# Patient Record
Sex: FEMALE | Race: OTHER | NOT HISPANIC OR LATINO
[De-identification: names, ages, dates, MRNs, and addresses within clinical notes are randomized per-mention and may not be internally consistent; named-entity substitution may affect disease eponyms.]

---

## 2019-06-06 PROBLEM — Z00.00 ENCOUNTER FOR PREVENTIVE HEALTH EXAMINATION: Status: ACTIVE | Noted: 2019-06-06

## 2019-06-10 ENCOUNTER — APPOINTMENT (OUTPATIENT)
Dept: SURGICAL ONCOLOGY | Facility: CLINIC | Age: 49
End: 2019-06-10
Payer: COMMERCIAL

## 2019-06-10 VITALS
SYSTOLIC BLOOD PRESSURE: 150 MMHG | HEART RATE: 96 BPM | OXYGEN SATURATION: 100 % | DIASTOLIC BLOOD PRESSURE: 89 MMHG | TEMPERATURE: 98 F

## 2019-06-10 VITALS — BODY MASS INDEX: 29.03 KG/M2 | HEIGHT: 67 IN | WEIGHT: 185 LBS

## 2019-06-10 DIAGNOSIS — Z78.9 OTHER SPECIFIED HEALTH STATUS: ICD-10-CM

## 2019-06-10 DIAGNOSIS — Z80.0 FAMILY HISTORY OF MALIGNANT NEOPLASM OF DIGESTIVE ORGANS: ICD-10-CM

## 2019-06-10 DIAGNOSIS — D05.11 INTRADUCTAL CARCINOMA IN SITU OF RIGHT BREAST: ICD-10-CM

## 2019-06-10 PROCEDURE — 99205 OFFICE O/P NEW HI 60 MIN: CPT

## 2019-06-10 NOTE — PHYSICAL EXAM
[Normal] : supple, no neck mass and thyroid not enlarged [Normal Axillary Lymph Nodes] : normal axillary lymph nodes [Normal Supraclavicular Lymph Nodes] : normal supraclavicular lymph nodes [Normal] : oriented to person, place and time, with appropriate affect [FreeTextEntry1] : Patient was interviewed and examined with chaperone present. \par  [de-identified] : Normal S1, S2. Regular Rate and rhythm [de-identified] : Complete bilateral breast examination performed supine and upright revealed no palpable masses, tenderness ,nipple discharge, inversion ,deviation or enlarged axillary or supraclavicular lymph node. [de-identified] : Clear breath sounds bilaterally, normal respiratory effort

## 2019-06-10 NOTE — ASSESSMENT
[FreeTextEntry1] : impression:\par Right breast Ca\par 3 sites : 2 DCIS ,1 IDC\par \par \par The patient and I discussed the surgical management of breast cancer. I explained that breast cancer can be treated with 2 main surgical approaches. One is breast conserving therapy. The other is mastectomy with or without immediate reconstruction by a plastic surgeon. Breast conserving therapy involves wide excision of the involved area. Negative margins must be achieved with this wide excision. In addition, evaluation of the lymph nodes either with a sentinel lymph node biopsy or an axillary lymph node dissection may be required. This treatment usually requires postoperative radiation to the breast. Treatment with mastectomy also involves evaluation of the lymph node with a sentinel lymph node biopsy or axillary lymph node dissection. Post operative radiation therapy may be needed even after mastectomy in certain advanced cases.\par \par Patient desires 3 site lumpectomy Understanding that a mastectomy would be the optimal surgical management of this problem. .\par \par Xray review for clip placement.\par \par Plan: Outpatient lumpectomy, SLN bx\par

## 2019-06-10 NOTE — CONSULT LETTER
[Dear  ___] : Dear  [unfilled], [Please see my note below.] : Please see my note below. [Consult Letter:] : I had the pleasure of evaluating your patient, [unfilled]. [FreeTextEntry2] : Maria Dolores Felton MD\par [address]\[place] [address] [Consult Closing:] : Thank you very much for allowing me to participate in the care of this patient.  If you have any questions, please do not hesitate to contact me. [Sincerely,] : Sincerely, [FreeTextEntry3] : Stephen Cao M.D.\par

## 2019-06-10 NOTE — HISTORY OF PRESENT ILLNESS
[de-identified] : 48-year-old woman with newly diagnosed ductal carcinoma in situ after a newly identified and group of fine pleomorphic microcalcifications were identified in the right breast on the mammogram dated 3/7/2019. Patient subsequently underwent MR at [facility] and an additional area was recommended for biopsy.

## 2019-06-17 ENCOUNTER — OUTPATIENT (OUTPATIENT)
Dept: OUTPATIENT SERVICES | Facility: HOSPITAL | Age: 49
LOS: 1 days | End: 2019-06-17
Payer: COMMERCIAL

## 2019-06-17 ENCOUNTER — RESULT REVIEW (OUTPATIENT)
Age: 49
End: 2019-06-17

## 2019-06-17 DIAGNOSIS — D05.11 INTRADUCTAL CARCINOMA IN SITU OF RIGHT BREAST: ICD-10-CM

## 2019-06-17 PROCEDURE — 88321 CONSLTJ&REPRT SLD PREP ELSWR: CPT

## 2019-06-18 LAB — SURGICAL PATHOLOGY STUDY: SIGNIFICANT CHANGE UP

## 2019-06-28 ENCOUNTER — OUTPATIENT (OUTPATIENT)
Dept: OUTPATIENT SERVICES | Facility: HOSPITAL | Age: 49
LOS: 1 days | End: 2019-06-28
Payer: COMMERCIAL

## 2019-06-28 VITALS
HEIGHT: 67 IN | SYSTOLIC BLOOD PRESSURE: 123 MMHG | TEMPERATURE: 98 F | RESPIRATION RATE: 18 BRPM | DIASTOLIC BLOOD PRESSURE: 84 MMHG | WEIGHT: 190.04 LBS | OXYGEN SATURATION: 100 % | HEART RATE: 87 BPM

## 2019-06-28 DIAGNOSIS — C50.911 MALIGNANT NEOPLASM OF UNSPECIFIED SITE OF RIGHT FEMALE BREAST: ICD-10-CM

## 2019-06-28 DIAGNOSIS — Z01.818 ENCOUNTER FOR OTHER PREPROCEDURAL EXAMINATION: ICD-10-CM

## 2019-06-28 DIAGNOSIS — Z98.891 HISTORY OF UTERINE SCAR FROM PREVIOUS SURGERY: Chronic | ICD-10-CM

## 2019-06-28 DIAGNOSIS — C50.919 MALIGNANT NEOPLASM OF UNSPECIFIED SITE OF UNSPECIFIED FEMALE BREAST: ICD-10-CM

## 2019-06-28 PROCEDURE — 85027 COMPLETE CBC AUTOMATED: CPT

## 2019-06-28 PROCEDURE — 80048 BASIC METABOLIC PNL TOTAL CA: CPT

## 2019-06-28 PROCEDURE — G0463: CPT

## 2019-06-28 RX ORDER — LIDOCAINE HCL 20 MG/ML
0.2 VIAL (ML) INJECTION ONCE
Refills: 0 | Status: DISCONTINUED | OUTPATIENT
Start: 2019-07-08 | End: 2019-07-23

## 2019-06-28 RX ORDER — SODIUM CHLORIDE 9 MG/ML
3 INJECTION INTRAMUSCULAR; INTRAVENOUS; SUBCUTANEOUS EVERY 8 HOURS
Refills: 0 | Status: DISCONTINUED | OUTPATIENT
Start: 2019-07-08 | End: 2019-07-23

## 2019-06-28 RX ORDER — CHLORHEXIDINE GLUCONATE 213 G/1000ML
1 SOLUTION TOPICAL DAILY
Refills: 0 | Status: DISCONTINUED | OUTPATIENT
Start: 2019-07-08 | End: 2019-07-23

## 2019-06-28 NOTE — H&P PST ADULT - HISTORY OF PRESENT ILLNESS
49yr old female with malignant neoplasm of breast coming in for right breast  lumpectomy [id number] post nohemi  reflector placement. Right axillary sentinel  lymph node biopsy

## 2019-06-28 NOTE — H&P PST ADULT - NSICDXPROBLEM_GEN_ALL_CORE_FT
PROBLEM DIAGNOSES  Problem: Malignant neoplasm of breast  Assessment and Plan: coming in for right breast lumpectomy [id number] post nohemi  reflector  placement. Right axillary sentinel lymph node biopsy

## 2019-07-04 ENCOUNTER — FORM ENCOUNTER (OUTPATIENT)
Age: 49
End: 2019-07-04

## 2019-07-05 ENCOUNTER — APPOINTMENT (OUTPATIENT)
Dept: MAMMOGRAPHY | Facility: IMAGING CENTER | Age: 49
End: 2019-07-05
Payer: COMMERCIAL

## 2019-07-05 ENCOUNTER — APPOINTMENT (OUTPATIENT)
Dept: MAMMOGRAPHY | Facility: IMAGING CENTER | Age: 49
End: 2019-07-05

## 2019-07-05 ENCOUNTER — OUTPATIENT (OUTPATIENT)
Dept: OUTPATIENT SERVICES | Facility: HOSPITAL | Age: 49
LOS: 1 days | End: 2019-07-05
Payer: COMMERCIAL

## 2019-07-05 DIAGNOSIS — Z00.8 ENCOUNTER FOR OTHER GENERAL EXAMINATION: ICD-10-CM

## 2019-07-05 DIAGNOSIS — Z98.891 HISTORY OF UTERINE SCAR FROM PREVIOUS SURGERY: Chronic | ICD-10-CM

## 2019-07-05 PROBLEM — E66.9 OBESITY, UNSPECIFIED: Chronic | Status: ACTIVE | Noted: 2019-06-28

## 2019-07-05 PROBLEM — C50.919 MALIGNANT NEOPLASM OF UNSPECIFIED SITE OF UNSPECIFIED FEMALE BREAST: Chronic | Status: ACTIVE | Noted: 2019-06-28

## 2019-07-05 PROCEDURE — 19281 PERQ DEVICE BREAST 1ST IMAG: CPT | Mod: RT

## 2019-07-05 PROCEDURE — C1739: CPT

## 2019-07-05 PROCEDURE — 19281 PERQ DEVICE BREAST 1ST IMAG: CPT

## 2019-07-05 PROCEDURE — 19282 PERQ DEVICE BREAST EA IMAG: CPT | Mod: 59,RT

## 2019-07-05 PROCEDURE — 19282 PERQ DEVICE BREAST EA IMAG: CPT

## 2019-07-07 ENCOUNTER — FORM ENCOUNTER (OUTPATIENT)
Age: 49
End: 2019-07-07

## 2019-07-07 ENCOUNTER — TRANSCRIPTION ENCOUNTER (OUTPATIENT)
Age: 49
End: 2019-07-07

## 2019-07-08 ENCOUNTER — APPOINTMENT (OUTPATIENT)
Dept: SURGICAL ONCOLOGY | Facility: HOSPITAL | Age: 49
End: 2019-07-08

## 2019-07-08 ENCOUNTER — OUTPATIENT (OUTPATIENT)
Dept: OUTPATIENT SERVICES | Facility: HOSPITAL | Age: 49
LOS: 1 days | End: 2019-07-08
Payer: COMMERCIAL

## 2019-07-08 ENCOUNTER — RESULT REVIEW (OUTPATIENT)
Age: 49
End: 2019-07-08

## 2019-07-08 VITALS
SYSTOLIC BLOOD PRESSURE: 108 MMHG | OXYGEN SATURATION: 100 % | HEART RATE: 63 BPM | DIASTOLIC BLOOD PRESSURE: 63 MMHG | RESPIRATION RATE: 16 BRPM

## 2019-07-08 VITALS
HEART RATE: 75 BPM | HEIGHT: 67 IN | WEIGHT: 190.04 LBS | SYSTOLIC BLOOD PRESSURE: 123 MMHG | RESPIRATION RATE: 18 BRPM | TEMPERATURE: 98 F | DIASTOLIC BLOOD PRESSURE: 84 MMHG | OXYGEN SATURATION: 100 %

## 2019-07-08 DIAGNOSIS — C50.911 MALIGNANT NEOPLASM OF UNSPECIFIED SITE OF RIGHT FEMALE BREAST: ICD-10-CM

## 2019-07-08 DIAGNOSIS — Z98.891 HISTORY OF UTERINE SCAR FROM PREVIOUS SURGERY: Chronic | ICD-10-CM

## 2019-07-08 DIAGNOSIS — Z01.818 ENCOUNTER FOR OTHER PREPROCEDURAL EXAMINATION: ICD-10-CM

## 2019-07-08 PROCEDURE — 38525 BIOPSY/REMOVAL LYMPH NODES: CPT | Mod: RT

## 2019-07-08 PROCEDURE — 88307 TISSUE EXAM BY PATHOLOGIST: CPT | Mod: 26

## 2019-07-08 PROCEDURE — 88300 SURGICAL PATH GROSS: CPT | Mod: 26,59

## 2019-07-08 PROCEDURE — 19125 EXCISION BREAST LESION: CPT | Mod: RT

## 2019-07-08 PROCEDURE — A9541: CPT

## 2019-07-08 PROCEDURE — 88300 SURGICAL PATH GROSS: CPT

## 2019-07-08 PROCEDURE — C1889: CPT

## 2019-07-08 PROCEDURE — 88305 TISSUE EXAM BY PATHOLOGIST: CPT

## 2019-07-08 PROCEDURE — 19301 PARTIAL MASTECTOMY: CPT | Mod: RT

## 2019-07-08 PROCEDURE — 76098 X-RAY EXAM SURGICAL SPECIMEN: CPT

## 2019-07-08 PROCEDURE — 76098 X-RAY EXAM SURGICAL SPECIMEN: CPT | Mod: 26

## 2019-07-08 PROCEDURE — 88307 TISSUE EXAM BY PATHOLOGIST: CPT

## 2019-07-08 PROCEDURE — 88305 TISSUE EXAM BY PATHOLOGIST: CPT | Mod: 26

## 2019-07-08 PROCEDURE — 38792 RA TRACER ID OF SENTINL NODE: CPT | Mod: RT

## 2019-07-08 RX ORDER — OXYCODONE HYDROCHLORIDE 5 MG/1
5 TABLET ORAL ONCE
Refills: 0 | Status: DISCONTINUED | OUTPATIENT
Start: 2019-07-08 | End: 2019-07-08

## 2019-07-08 RX ORDER — ONDANSETRON 8 MG/1
4 TABLET, FILM COATED ORAL ONCE
Refills: 0 | Status: COMPLETED | OUTPATIENT
Start: 2019-07-08 | End: 2019-07-08

## 2019-07-08 RX ORDER — NORETHINDRONE 0.35 MG/1
1 TABLET ORAL
Qty: 0 | Refills: 0 | DISCHARGE

## 2019-07-08 RX ORDER — ACETAMINOPHEN 500 MG
1000 TABLET ORAL ONCE
Refills: 0 | Status: COMPLETED | OUTPATIENT
Start: 2019-07-08 | End: 2019-07-08

## 2019-07-08 RX ORDER — CELECOXIB 200 MG/1
200 CAPSULE ORAL ONCE
Refills: 0 | Status: COMPLETED | OUTPATIENT
Start: 2019-07-08 | End: 2019-07-08

## 2019-07-08 RX ORDER — CEFAZOLIN SODIUM 1 G
2000 VIAL (EA) INJECTION ONCE
Refills: 0 | Status: COMPLETED | OUTPATIENT
Start: 2019-07-08 | End: 2019-07-08

## 2019-07-08 RX ORDER — METOCLOPRAMIDE HCL 10 MG
10 TABLET ORAL ONCE
Refills: 0 | Status: COMPLETED | OUTPATIENT
Start: 2019-07-08 | End: 2019-07-08

## 2019-07-08 RX ADMIN — ONDANSETRON 4 MILLIGRAM(S): 8 TABLET, FILM COATED ORAL at 13:10

## 2019-07-08 RX ADMIN — Medication 10 MILLIGRAM(S): at 13:33

## 2019-07-08 RX ADMIN — CHLORHEXIDINE GLUCONATE 1 APPLICATION(S): 213 SOLUTION TOPICAL at 08:35

## 2019-07-08 NOTE — BRIEF OPERATIVE NOTE - LESION SIZE
[id number] mass  [id number] superior margin  [id number] inferior margin   [id number] lateral margin  [id number] medial margin  [id number] anterior margin   [id number] posterior margin

## 2019-07-08 NOTE — BRIEF OPERATIVE NOTE - OPERATION/FINDINGS
[id number] mass removed which included all three radioactive seeds. Remaining breast tissue intact. Axillary tissue minimally dissected and sentinel lymph node removed.

## 2019-07-08 NOTE — BRIEF OPERATIVE NOTE - NSICDXBRIEFPREOP_GEN_ALL_CORE_FT
PRE-OP DIAGNOSIS:  Invasive ductal carcinoma of right breast 08-Jul-2019 12:56:48  Zoila Tamez  Ductal carcinoma in situ (DCIS) of right breast 08-Jul-2019 12:54:00  Zoila Tamez

## 2019-07-08 NOTE — BRIEF OPERATIVE NOTE - NSICDXBRIEFPROCEDURE_GEN_ALL_CORE_FT
PROCEDURES:  Biopsy of right axillary lymph node with axillary lymphadenectomy if indicated 08-Jul-2019 12:56:30  Zoila Tamez  Right breast lumpectomy 08-Jul-2019 12:54:31  Zoila Tamez

## 2019-07-11 LAB — SURGICAL PATHOLOGY STUDY: SIGNIFICANT CHANGE UP

## 2019-07-22 ENCOUNTER — OTHER (OUTPATIENT)
Age: 49
End: 2019-07-22

## 2019-07-22 ENCOUNTER — APPOINTMENT (OUTPATIENT)
Dept: SURGICAL ONCOLOGY | Facility: CLINIC | Age: 49
End: 2019-07-22
Payer: COMMERCIAL

## 2019-07-22 VITALS
BODY MASS INDEX: 29.03 KG/M2 | WEIGHT: 185 LBS | HEIGHT: 67 IN | DIASTOLIC BLOOD PRESSURE: 94 MMHG | RESPIRATION RATE: 15 BRPM | SYSTOLIC BLOOD PRESSURE: 144 MMHG | HEART RATE: 100 BPM

## 2019-07-22 PROCEDURE — 99024 POSTOP FOLLOW-UP VISIT: CPT

## 2019-07-22 NOTE — HISTORY OF PRESENT ILLNESS
[de-identified] : 49-year-old woman presents for an initial postop visit, status post right breast lumpectomy and SLNB on 7/8/19.  Final pathology was consistent with 0.6 cm invasive ductal carcinoma with one benign axillary lymph node and negative margins (T1bN0, ER+/NM-/HER2-).  She is feeling well and denies significant pain, fever, chills, erythema, swelling or drainage from the incision sites. \par \par Previous pertinent history is as follows:\par \par She was initially seen in consultation on 6/10/19 for newly diagnosed ductal carcinoma in situ after a newly identified and group of fine pleomorphic microcalcifications were identified in the right breast on the mammogram dated 3/7/2019. Patient subsequently underwent MR at [facility] and an additional area was recommended for biopsy which was ultimately found to be an invasive cancer.

## 2019-07-22 NOTE — ASSESSMENT
[FreeTextEntry1] : IMP:\par S/p right breast lumpectomy 7/8/19\par Final pathology T1bN0, ER+/KS-/HER2- with negative margins\par \par PLAN:\par Oncotype DX\par Refer for medical and radiation oncology evaluation\par RTO in 6 months

## 2019-07-22 NOTE — PHYSICAL EXAM
[Normal] : well developed, well nourished, in no acute distress [FreeTextEntry1] : AB present during exam  [de-identified] : Right breast and right axillary incision incision healing well with no evidence of infection, hematoma or seroma.

## 2019-08-26 ENCOUNTER — OUTPATIENT (OUTPATIENT)
Dept: OUTPATIENT SERVICES | Facility: HOSPITAL | Age: 49
LOS: 1 days | Discharge: HOME | End: 2019-08-26

## 2019-08-26 DIAGNOSIS — Z98.891 HISTORY OF UTERINE SCAR FROM PREVIOUS SURGERY: Chronic | ICD-10-CM

## 2019-08-26 DIAGNOSIS — C50.919 MALIGNANT NEOPLASM OF UNSPECIFIED SITE OF UNSPECIFIED FEMALE BREAST: ICD-10-CM

## 2019-08-27 ENCOUNTER — APPOINTMENT (OUTPATIENT)
Dept: HEMATOLOGY ONCOLOGY | Facility: CLINIC | Age: 49
End: 2019-08-27
Payer: COMMERCIAL

## 2019-08-27 VITALS
HEART RATE: 85 BPM | TEMPERATURE: 98.4 F | DIASTOLIC BLOOD PRESSURE: 85 MMHG | HEIGHT: 67 IN | OXYGEN SATURATION: 100 % | SYSTOLIC BLOOD PRESSURE: 134 MMHG | BODY MASS INDEX: 29.03 KG/M2 | WEIGHT: 185 LBS | RESPIRATION RATE: 16 BRPM

## 2019-08-27 DIAGNOSIS — C50.911 MALIGNANT NEOPLASM OF UNSPECIFIED SITE OF RIGHT FEMALE BREAST: ICD-10-CM

## 2019-08-27 PROCEDURE — 99205 OFFICE O/P NEW HI 60 MIN: CPT

## 2019-08-28 PROBLEM — C50.911 INFILTRATING DUCTAL CARCINOMA OF RIGHT BREAST: Status: ACTIVE | Noted: 2019-06-10

## 2019-08-28 NOTE — HISTORY OF PRESENT ILLNESS
[Disease: _____________________] : Disease: [unfilled] [T: ___] : T[unfilled] [N: ___] : N[unfilled] [M: ___] : M[unfilled] [AJCC Stage: ____] : AJCC Stage: [unfilled] [de-identified] : Please see dictated initial consultation note scanned into AEHR [de-identified] : ER+ OK- Her 2 gabriela - [de-identified] : S

## 2019-10-22 ENCOUNTER — OTHER (OUTPATIENT)
Age: 49
End: 2019-10-22

## 2019-10-22 RX ORDER — TAMOXIFEN CITRATE 20 MG/1
20 TABLET, FILM COATED ORAL DAILY
Qty: 90 | Refills: 3 | Status: ACTIVE | COMMUNITY
Start: 2019-10-22 | End: 1900-01-01

## 2020-01-10 ENCOUNTER — APPOINTMENT (OUTPATIENT)
Dept: SURGICAL ONCOLOGY | Facility: CLINIC | Age: 50
End: 2020-01-10
Payer: COMMERCIAL

## 2020-01-10 VITALS
BODY MASS INDEX: 29.03 KG/M2 | HEIGHT: 67 IN | SYSTOLIC BLOOD PRESSURE: 148 MMHG | RESPIRATION RATE: 18 BRPM | DIASTOLIC BLOOD PRESSURE: 90 MMHG | WEIGHT: 185 LBS | HEART RATE: 86 BPM | OXYGEN SATURATION: 100 %

## 2020-01-10 DIAGNOSIS — Z85.3 ENCOUNTER FOR FOLLOW-UP EXAMINATION AFTER COMPLETED TREATMENT FOR MALIGNANT NEOPLASM: ICD-10-CM

## 2020-01-10 DIAGNOSIS — Z08 ENCOUNTER FOR FOLLOW-UP EXAMINATION AFTER COMPLETED TREATMENT FOR MALIGNANT NEOPLASM: ICD-10-CM

## 2020-01-10 PROCEDURE — 99214 OFFICE O/P EST MOD 30 MIN: CPT

## 2020-01-10 NOTE — ASSESSMENT
[FreeTextEntry1] : Imp:\par S/p RT breast lumpectomy and SNL biopsy. \par Final Pathology revealed RT breast invasive ducal CA and DCIS. Lymph node was negative for CA. \par No evidence of new or recurrent lesions. Breast imaging failed to identify any suspicious lesions. No suspicious lesions on exam.\par Due for new imaging.\par On ANDRADE\par \par Plan: \par Continue yearly surveillance\par

## 2020-01-10 NOTE — PHYSICAL EXAM
[FreeTextEntry1] : DT was present for the exam.\par  [Normal Supraclavicular Lymph Nodes] : normal supraclavicular lymph nodes [Normal Axillary Lymph Nodes] : normal axillary lymph nodes [Normal] : oriented to person, place and time, with appropriate affect [de-identified] : Complete bilateral breast examination performed supine and upright revealed no palpable masses, tenderness ,nipple discharge, inversion ,deviation or enlarged axillary or supraclavicular lymph node.

## 2020-01-10 NOTE — HISTORY OF PRESENT ILLNESS
[de-identified] : 50 y/o female presents for a post-op visit for RT breast lumpectomy and RT axillary lymph node biopsy performed on 7/8/19. Pathology revealed RT breast invasive ducal CA and DCIS. Lymph node was negative for CA. RT breast superior, medial, inferior, and deep true margins revealed unremarkable breast tissue. Pt is (T1bN0, ER+/CA-/HER2-)\par \par Pathology (7/8/19):\par 1.  Lymph node, right axillary sentinel, sentinel node biopsy\par - No tumor is present in single node (0/1)\par - Extracapsular aggregate of nevus cells\par \par 2.  Breast, right, lumpectomy\par - Invasive duct carcinoma, moderately differentiated (0.6cm)\par - Ductal carcinoma in situ extensive, intermediate nuclear grade, solid/cribriform/clinging/micropapillary with prominent\par cancerization of lobules\par - Biopsy site change\par \par Today the pt was w/o any complaints. Denies palpable breast masses, nipple discharge, skin changes, inversion of breast pain. Denies constitutional symptoms\par \par Past Hx: \par She was initially seen in consultation on 6/10/19 for newly diagnosed ductal carcinoma in situ after a newly identified and group of fine pleomorphic microcalcifications were identified in the right breast on the mammogram dated 3/7/2019. Patient subsequently underwent MR at [facility] and an additional area was recommended for biopsy which was ultimately found to be an invasive cancer. \par \par Due for mammo March but breast .\par No palp masses or d/c\par On ANDRADE

## 2021-01-10 ENCOUNTER — TRANSCRIPTION ENCOUNTER (OUTPATIENT)
Age: 51
End: 2021-01-10

## 2021-02-05 ENCOUNTER — TRANSCRIPTION ENCOUNTER (OUTPATIENT)
Age: 51
End: 2021-02-05
